# Patient Record
Sex: FEMALE | Race: AMERICAN INDIAN OR ALASKA NATIVE
[De-identification: names, ages, dates, MRNs, and addresses within clinical notes are randomized per-mention and may not be internally consistent; named-entity substitution may affect disease eponyms.]

---

## 2019-02-08 ENCOUNTER — HOSPITAL ENCOUNTER (EMERGENCY)
Dept: HOSPITAL 5 - ED | Age: 59
Discharge: HOME | End: 2019-02-08
Payer: SELF-PAY

## 2019-02-08 PROCEDURE — 99282 EMERGENCY DEPT VISIT SF MDM: CPT

## 2019-02-08 NOTE — EMERGENCY DEPARTMENT REPORT
HPI





- General


Chief Complaint: Extremity Problem,Nontraumatic


Time Seen by Provider: 02/08/19 11:53





- HPI


HPI: 





58 y o f with no PMH presents with bilat foot to ankle pain from walking and 

standing prolonged at work.  She reports that her foot is aching and throbbing 

pain.  She states that this pain is worsened with standing and walking.  She 

denies injury, trauma, fall.





ED Past Medical Hx





- Past Medical History


Hx Hypertension: Yes


Hx Asthma: Yes





- Surgical History


Past Surgical History?: Yes


Additional Surgical History: HYSTERECTOMY.  TONSILLECTOMY





- Social History


Smoking Status: Never Smoker


Substance Use Type: None





- Medications


Home Medications: 


                                Home Medications











 Medication  Instructions  Recorded  Confirmed  Last Taken  Type


 


Cyclobenzaprine [Flexeril] 10 mg PO QHS PRN #20 tablet 02/08/19  Unknown Rx


 


Ibuprofen [Motrin] 800 mg PO Q8HR #30 tablet 02/08/19  Unknown Rx














ED Review of Systems


ROS: 


Stated complaint: LEFT FOOT PAIN


Other details as noted in HPI





Comment: All other systems reviewed and negative





Physical Exam





- Physical Exam


Vital Signs: 


                                   Vital Signs











  02/08/19





  11:52


 


Temperature 97.9 F


 


Pulse Rate 87


 


Respiratory 16





Rate 


 


Blood Pressure 123/72


 


O2 Sat by Pulse 98





Oximetry 











Physical Exam: 





GENERAL: Alert and oriented x3, no apparent distress, Normal Gait, atraumatic.


HEAD: Head is normocephalic and a-traumatic.








EXTREMITIES/MUSCULOSKELETAL: No cyanosis, clubbing, rash, lesions or edema. Full

 ROM bilaterally. UE/LE Pulses 2+ bilaterally.  LE and UE 5+ strength 

bilaterally, no swelling, no deformity seen on bilateral foot and ankle, 

nontender to palpation, patient able to flex and extend


NEUROLOGIC:  The patient is cooperative with no focal neurologic deficits. 


SKIN:  Warm and dry, No lesions, No ulceration or induration present.








ED Course


                                   Vital Signs











  02/08/19





  11:52


 


Temperature 97.9 F


 


Pulse Rate 87


 


Respiratory 16





Rate 


 


Blood Pressure 123/72


 


O2 Sat by Pulse 98





Oximetry 














ED Medical Decision Making





- Medical Decision Making





58-year-old female presents with a bilateral foot pain, most likely strain,


Upon evaluation no deformity seen, x-rays not needed.


Suggested to patient to keep from for a long period of time.


Suggested patient to use Dr. Márquez's insoles


Discussed to follow up with primary care physician.





Critical care attestation.: 


If time is entered above; I have spent that time in minutes in the direct care 

of this critically ill patient, excluding procedure time.








ED Disposition


Clinical Impression: 


 Muscle strain of foot, Arthralgia





Disposition: DC-01 TO HOME OR SELFCARE


Is pt being admited?: No


Does the pt Need Aspirin: No


Condition: Stable


Instructions:  Muscle Strain (ED), Arthralgia (ED), Ankle Exercises (GEN)


Additional Instructions: 


f/u with pcp/ortho


take meds as prescribed


Do not take flexeril while operating machinery or drink alcohol


return to ED  if worsening symptoms


Prescriptions: 


Cyclobenzaprine [Flexeril] 10 mg PO QHS PRN #20 tablet


 PRN Reason: Muscle Spasm


Ibuprofen [Motrin] 800 mg PO Q8HR #30 tablet


Referrals: 


TEENA MORROW MD [Staff Physician] - 3-5 Days


Forms:  Work/School Release Form(ED)


Time of Disposition: 12:00